# Patient Record
Sex: FEMALE | Race: BLACK OR AFRICAN AMERICAN | Employment: FULL TIME | URBAN - METROPOLITAN AREA
[De-identification: names, ages, dates, MRNs, and addresses within clinical notes are randomized per-mention and may not be internally consistent; named-entity substitution may affect disease eponyms.]

---

## 2017-06-22 PROBLEM — Z13.228 SCREENING FOR ENDOCRINE, NUTRITIONAL, METABOLIC AND IMMUNITY DISORDER: Status: ACTIVE | Noted: 2017-06-22

## 2017-06-22 PROBLEM — Z13.29 SCREENING FOR THYROID DISORDER: Status: ACTIVE | Noted: 2017-06-22

## 2017-06-22 PROBLEM — Z13.0 SCREENING FOR ENDOCRINE, NUTRITIONAL, METABOLIC AND IMMUNITY DISORDER: Status: ACTIVE | Noted: 2017-06-22

## 2017-06-22 PROBLEM — Z01.419 ENCOUNTER FOR GYNECOLOGICAL EXAMINATION WITHOUT ABNORMAL FINDING: Status: ACTIVE | Noted: 2017-06-22

## 2017-06-22 PROBLEM — Z13.21 SCREENING FOR ENDOCRINE, NUTRITIONAL, METABOLIC AND IMMUNITY DISORDER: Status: ACTIVE | Noted: 2017-06-22

## 2017-06-22 PROBLEM — Z13.29 SCREENING FOR ENDOCRINE, NUTRITIONAL, METABOLIC AND IMMUNITY DISORDER: Status: ACTIVE | Noted: 2017-06-22

## 2017-06-22 PROCEDURE — 82652 VIT D 1 25-DIHYDROXY: CPT | Performed by: OBSTETRICS & GYNECOLOGY

## 2017-06-22 PROCEDURE — 88175 CYTOPATH C/V AUTO FLUID REDO: CPT | Performed by: OBSTETRICS & GYNECOLOGY

## 2017-06-22 PROCEDURE — 80050 GENERAL HEALTH PANEL: CPT | Performed by: OBSTETRICS & GYNECOLOGY

## 2017-06-22 PROCEDURE — 36415 COLL VENOUS BLD VENIPUNCTURE: CPT | Performed by: OBSTETRICS & GYNECOLOGY

## 2017-06-22 PROCEDURE — 87624 HPV HI-RISK TYP POOLED RSLT: CPT | Performed by: OBSTETRICS & GYNECOLOGY

## 2017-10-08 NOTE — ED AVS SNAPSHOT
Eze Pate   MRN: [id number]    Department:  [facility] Emergency Department   Date of Visit:  4/18/2018           Disclosure     Insurance plans vary and the physician(s) referred by the ER may not be covered by your plan.  Please contac CARE PHYSICIAN AT ONCE OR RETURN IMMEDIATELY TO THE EMERGENCY DEPARTMENT. If you have been prescribed any medication(s), please fill your prescription right away and begin taking the medication(s) as directed.   If you believe that any of the medications Alert and oriented to person, place and time

## 2018-04-18 ENCOUNTER — HOSPITAL ENCOUNTER (EMERGENCY)
Facility: HOSPITAL | Age: 33
Discharge: HOME | End: 2018-04-18
Attending: EMERGENCY MEDICINE
Payer: COMMERCIAL

## 2018-04-18 ENCOUNTER — APPOINTMENT (OUTPATIENT)
Dept: CT IMAGING | Facility: HOSPITAL | Age: 33
End: 2018-04-18
Attending: EMERGENCY MEDICINE
Payer: COMMERCIAL

## 2018-04-18 VITALS
HEART RATE: 67 BPM | HEIGHT: 65 IN | BODY MASS INDEX: 34.16 KG/M2 | TEMPERATURE: 98 F | SYSTOLIC BLOOD PRESSURE: 118 MMHG | DIASTOLIC BLOOD PRESSURE: 73 MMHG | WEIGHT: 205 LBS | RESPIRATION RATE: 18 BRPM | OXYGEN SATURATION: 100 %

## 2018-04-18 DIAGNOSIS — R53.81 MALAISE: Primary | ICD-10-CM

## 2018-04-18 DIAGNOSIS — R11.0 NAUSEA: ICD-10-CM

## 2018-04-18 PROCEDURE — 70450 CT HEAD/BRAIN W/O DYE: CPT | Performed by: EMERGENCY MEDICINE

## 2018-04-18 PROCEDURE — 81025 URINE PREGNANCY TEST: CPT

## 2018-04-18 PROCEDURE — 81001 URINALYSIS AUTO W/SCOPE: CPT | Performed by: EMERGENCY MEDICINE

## 2018-04-18 PROCEDURE — 96374 THER/PROPH/DIAG INJ IV PUSH: CPT

## 2018-04-18 PROCEDURE — 96361 HYDRATE IV INFUSION ADD-ON: CPT

## 2018-04-18 PROCEDURE — 85025 COMPLETE CBC W/AUTO DIFF WBC: CPT | Performed by: EMERGENCY MEDICINE

## 2018-04-18 PROCEDURE — 80053 COMPREHEN METABOLIC PANEL: CPT | Performed by: EMERGENCY MEDICINE

## 2018-04-18 PROCEDURE — 99284 EMERGENCY DEPT VISIT MOD MDM: CPT

## 2018-04-18 RX ORDER — METOCLOPRAMIDE HYDROCHLORIDE 5 MG/ML
10 INJECTION INTRAMUSCULAR; INTRAVENOUS ONCE
Status: COMPLETED | OUTPATIENT
Start: 2018-04-18 | End: 2018-04-18

## 2018-04-18 RX ORDER — METOCLOPRAMIDE 10 MG/1
10 TABLET ORAL EVERY 6 HOURS PRN
Qty: 20 TABLET | Refills: 0 | Status: SHIPPED | OUTPATIENT
Start: 2018-04-18 | End: 2018-04-23

## 2018-04-18 NOTE — ED INITIAL ASSESSMENT (HPI)
Pt reports nausea and \"jittery\" xtoday, reports feeling weak for past 3 days. Denies fevers. Denies vomiting/diarrhea.

## 2018-04-18 NOTE — ED PROVIDER NOTES
Patient Seen in: [facility] AND [facility] Emergency Department    History   Patient presents with:  Nausea    Stated Complaint: nausea    HPI    29 yo F without PMH aside from abdominoplasty and s/p herniorraphy several years ago presenting with one week of worse Normocephalic. Atraumatic. Eyes: No injection. Pupils midrange and equally reactive. Neck: No meningismus. Cardiovascular: RRR. Pulmonary/Chest: Effort normal. CTAB. Abdominal: Soft. Nontender. Musculoskeletal: No gross deformity.   Neurological: Al FINDINGS:  CSF SPACES: Ventricles, cisterns, and sulci are appropriate for age. No hydrocephalus, subarachnoid hemorrhage, or mass. No midline shift. CEREBRUM: No edema, hemorrhage, mass, acute infarction, or significant atrophy.   WHITE MATTER: Normal w 10 MG Oral Tab  Take 1 tablet (10 mg total) by mouth every 6 (six) hours as needed (For headache/nausea/vomiting. )., Print Script, Disp-20 tablet, R-0